# Patient Record
Sex: MALE | Race: BLACK OR AFRICAN AMERICAN
[De-identification: names, ages, dates, MRNs, and addresses within clinical notes are randomized per-mention and may not be internally consistent; named-entity substitution may affect disease eponyms.]

---

## 2017-06-16 ENCOUNTER — HOSPITAL ENCOUNTER (EMERGENCY)
Dept: HOSPITAL 44 - ED | Age: 37
Discharge: HOME | End: 2017-06-16
Payer: SELF-PAY

## 2017-06-16 VITALS — DIASTOLIC BLOOD PRESSURE: 64 MMHG | SYSTOLIC BLOOD PRESSURE: 118 MMHG

## 2017-06-16 DIAGNOSIS — G43.019: Primary | ICD-10-CM

## 2017-06-16 PROCEDURE — 99283 EMERGENCY DEPT VISIT LOW MDM: CPT

## 2017-06-16 PROCEDURE — 96372 THER/PROPH/DIAG INJ SC/IM: CPT

## 2017-06-16 RX ADMIN — PROMETHAZINE HYDROCHLORIDE ONE MG: 25 INJECTION INTRAMUSCULAR; INTRAVENOUS at 21:01

## 2017-06-16 RX ADMIN — KETOROLAC TROMETHAMINE ONE MG: 30 INJECTION, SOLUTION INTRAMUSCULAR at 21:01

## 2017-06-16 NOTE — ED PHYSICIAN DOCUMENTATION
Headache





- HISTORIAN


Historian: patient





- HPI


Stated Complaint: Migraine HA


Chief Complaint: Headache


Additional Information: 





started today, similar to other migraines


Onset: hours (2)


Timing: abrupt, still present


New Gradual Onset: Yes


Exposure To: none


Severity: moderate


Quality: similar to previous


Associated Symptoms: sensitivity to light, nausea


Preceding Symptoms: denies: visual disturbance, scotoma


Exacerbated By: light


Further Comments: no





- ROS


NEURO/PSYCH: denies: confusion, anxiety, depression, fainting


EYES/ENT: denies: sore throat, difficulty swallowing, sinus pain, drainage


CVS/RESP: none


GI/: denies: abdominal pain, diarrhea, problems urinating, incontinence


MS/SKIN/LYMPH: denies: muscle aches, back pain, rash, skin lesions, swollen 

glands





- PAST HX


Medical History: other (migraines)


Surgical History: no surgical history


Immunizations: referred to PCP


Allergies/Adverse Reactions: 


 Allergies











Allergy/AdvReac Type Severity Reaction Status Date / Time


 


Penicillins Allergy Severe Anaphylaxis Verified 06/16/17 20:33


 


amoxicillin Allergy   Verified 06/16/17 20:33














Home Medications: 


 Ambulatory Orders











 Medication  Instructions  Recorded


 


NK [NK]  11/27/16














- SOCIAL HX


Smoking History: cigarettes


Alcohol Use: occasionally


Drug Use: none





- Family HX


Family History: none





- VITAL SIGNS


Vital Signs: 





 Vital Signs











Temp Pulse Resp BP Pulse Ox


 


 97.8 F   50 L  14   117/68   99 


 


 06/16/17 20:19  06/16/17 20:19  06/16/17 20:19  06/16/17 20:19  06/16/17 20:19














- REVIEWED ASSESSMENTS


Nursing Assessment  Reviewed: No


Vitals Reviewed: No





Progress





- Results/Orders


Results/Orders: 





no testing ordered





- Progress


Progress: 





pt. given 6 mg imitrex sq, 60 mg toradol im  and 25 mg phenergan im in er








Critical Care Note





- Critical Care Note


Total Time (mins): 0





ED Results Lab/Radiology





- Lab Results


Lab Results: 


none taken





- Radiology


Radiology Impressions: 





none taken





- Orders


Orders: 





 ED Orders











 Category Date Time Status


 


 Ketorolac Tromethamine [Toradol] Med  06/16/17 20:45 Once





 60 mg IM NOW ONE   


 


 Promethazine HCl [Phenergan] Med  06/16/17 20:45 Once





 25 mg IM NOW ONE   


 


 SUMAtriptan SUCCINATE [Imitrex] Med  06/16/17 20:45 Once





 6 mg SQ NOW ONE   














Headache Physical Exam





- EXAM


General Appearance: alert, moderate distress


EENT: no facial swelling, PERRL, tender temporal artery, photophobia.  No: pain 

over sinuses


Neck: normal inspection, thyroid normal, supple.  No: thyromegaly, 

lymphadenopathy, stiff neck


Respiratory: no resp distress, chest non-tender, breath sounds normal


CVS: reg. rate & rhythm, heart sounds nml


Abdomen: non-tender, no organomegaly, nml bowel sounds, no distention


Skin: color nml, no rash.  No: cyanosis


Extremitites: non-tender, normal range of motion, no evidence of injury, no 

edema





- NEURO/PSYCH


Higher Functions: alert, oriented x3, nml speech, mood/affect nml


Cranial: nml as tested, no evidence of acute CVA


Cerebellar: nml as tested


Sensorimotor: motor nml, sensation nml





Discharge


Clincal Impression: 


Migraine


Qualifiers:


 Migraine type: without aura Status migrainosus presence: without status 

migrainosus Intractability: intractable Qualified Code(s): G43.019 - Migraine 

without aura, intractable, without status migrainosus





Referrals: 


Primary Doctor,No [Primary Care Provider] - 2 Days


Home Medications: 


Ambulatory Orders





NK [NK]  11/27/16 








Comments: 





discharged in stable condition with script for imitrex 100 mg 1 p.o. at onset 

of headache, may repeat x 1 in 2 hours prn.  Max dose 2 in 24 hours.


Condition: Stable


Disposition: 01 HOME, SELF-CARE


Decision to Admit: NO


Decision Time: 20:50

## 2018-02-15 ENCOUNTER — HOSPITAL ENCOUNTER (EMERGENCY)
Dept: HOSPITAL 44 - ED | Age: 38
Discharge: ANOTHER HOSPITAL | End: 2018-02-15
Payer: SELF-PAY

## 2018-02-15 VITALS — DIASTOLIC BLOOD PRESSURE: 74 MMHG | SYSTOLIC BLOOD PRESSURE: 134 MMHG

## 2018-02-15 DIAGNOSIS — H61.22: ICD-10-CM

## 2018-02-15 DIAGNOSIS — J36: Primary | ICD-10-CM

## 2018-02-15 DIAGNOSIS — D72.825: ICD-10-CM

## 2018-02-15 DIAGNOSIS — F17.210: ICD-10-CM

## 2018-02-15 LAB
BASOPHILS NFR BLD: 0.3 % (ref 0–1.5)
EGFR (AFRICAN): > 60
EGFR (NON-AFRICAN): > 60
EOSINOPHIL NFR BLD: 1.8 % (ref 0–6.8)
MCH RBC QN AUTO: 28.5 PG (ref 28–34)
MCV RBC AUTO: 89.4 FL (ref 80–100)
MONOCYTES %: 3.8 % (ref 0–11)
NEUTROPHILS #: 14.3 # K/UL (ref 1.4–7.7)

## 2018-02-15 PROCEDURE — S1016 NON-PVC INTRAVENOUS ADMINIST: HCPCS

## 2018-02-15 PROCEDURE — 99283 EMERGENCY DEPT VISIT LOW MDM: CPT

## 2018-02-15 PROCEDURE — 80048 BASIC METABOLIC PNL TOTAL CA: CPT

## 2018-02-15 PROCEDURE — 87070 CULTURE OTHR SPECIMN AEROBIC: CPT

## 2018-02-15 PROCEDURE — 70490 CT SOFT TISSUE NECK W/O DYE: CPT

## 2018-02-15 PROCEDURE — 85025 COMPLETE CBC W/AUTO DIFF WBC: CPT

## 2018-02-15 PROCEDURE — 87880 STREP A ASSAY W/OPTIC: CPT

## 2018-02-15 PROCEDURE — 96375 TX/PRO/DX INJ NEW DRUG ADDON: CPT

## 2018-02-15 PROCEDURE — 96365 THER/PROPH/DIAG IV INF INIT: CPT

## 2018-02-15 NOTE — ED PHYSICIAN DOCUMENTATION
Ear Complaints





- HISTORIAN


Historian: patient





- HPI


Stated Complaint: left ear pain


Chief Complaint: Ear Complaints


Timing: still present, worse


Further Comments: yes (37 year old male patient presents with complaint of left 

ear pain x 3 days.  Patient states he has been using tylenol at home.  Reports 

nasal congestion and sore throat.)





- ROS


CONST: no problems


CVS/RESP: none


GI/: denies: nausea, vomiting


MS/SKIN/LYMPH: none


NEURO/PSYCH: denies: weakness





- PAST HX


Past History: none


Allergies/Adverse Reactions: 


 Allergies











Allergy/AdvReac Type Severity Reaction Status Date / Time


 


Penicillins Allergy Severe Anaphylaxis Verified 02/15/18 10:33


 


amoxicillin Allergy   Verified 02/15/18 10:33


 


shellfish derived Allergy   Verified 02/15/18 12:49














Home Medications: 


 Ambulatory Orders











 Medication  Instructions  Recorded


 


NK [NK]  11/27/16














- SOCIAL HX


Smoking History: cigarettes





- FAMILY HX


Family History: No





- VITAL SIGNS


Vital Signs: 





 Vital Signs











Temp Pulse Resp BP Pulse Ox


 


 98.3 F   82   20   151/95   96 


 


 02/15/18 09:49  02/15/18 09:49  02/15/18 09:49  02/15/18 09:49  02/15/18 09:49














- REVIEWED ASSESSMENTS


Nursing Assessment  Reviewed: Yes


Vitals Reviewed: Yes





Progress





- Progress


Progress: 





Attempted to remove impaction with ear loop, patient unable to tolerate the 

pain. 





Irrigated - unable to completely remove impaction due to pain. 





left oropharynx with significant edema noted, will obtain CT soft tissue neck 

to r/o peritonsilar abscess. Patient with allergy to shellfish, will not use 

contrast, allergy caused difficulty breathing and "swelling" at age 12. 





Call from Dr Duran - reviewed CT findings.





1305 Discussed CT results with patient, recommended transfer to [facility] for ENT 

consult. 





1315  Patient accepted by Dr Camacho to ER for evaluation, orders for 

Clindamycin IV 





ED Results Lab/Radiology





- Radiology


Radiology Impressions: 





Examination: CT neck





History: STN W/O DUE TO ALLERGY TO SHELLFISH PATIENT STATES SWELLING BACK/UNDER 

TONGUE LEFT SIDE AND THROAT X 3 DAYS (Hx) / EDEMA SOFT TISSUE PHARNYX (DICOM Hx)





Comparison exams: None provided





Technique: CT neck with contrast





Findings: Significant swelling involving the left tonsillar region. Suggestion 

for a faint low density region centrally measuring approximately 6 mm. Streak 

artifact from dental hardware limits sensitivity visualizing the central aspect 

of this area. Prominent pre vertebral adenoid tissue. Parotid and submandibular 

glands are without abnormality.No pathologic adenopathy involving the carotid, 

jugular and posterior cervical chain regions.  Oropharynx is narrowed and 

shifted to the right. Lower neck structures including thyroid gland are without 

abnormality. Apical lung fields and osseous structures are within normal 

limits. Skull base structures including brain parenchyma are without 

abnormality.





Impression: Significant left oropharynx soft tissue fullness/edema. Suggestion 

for low density area centrally possibly representing peritonsillar abscess. 

Exam sensitivity is limited due to inability to administer contrast as well as 

streak artifact from dental hardware. ENT consultation recommended.





Discussed findings with Dr. Garcia at 1300 hours on 15 February 2018 CDT


 


Electronically signed on Feb 15, 2018 1:03:02 PM CST by:


Yobany Duran





- Orders


Orders: 





 ED Orders











 Category Date Time Status


 


 Irrigate Ear 1T Care  02/15/18 10:56 Active


 


 Rapid Strep [GRP A STREP SCREEN] Stat Lab  02/15/18 10:19 Ordered














Ear Complaint Physical Exam





- EXAM


General Appearance: moderate distress, other (muffled, hot potato voice noted)


Ear: left, cerumen (impaction left ear)


Mouth/Throat: lips nml, gums nml, pharyngeal erythema (significant edema noted 

in left oropharynx)


Nose: nml inspection


Head/Neck: atraumatic, neck swelling (left - tonsilar, submandibular, submental 

and anterior cervical lymph chains with edema noted. )


Resp/CVS: chest non-tender, breath sounds nml, heart sounds nml


Abdomen: non-tender, no organomegaly


Skin: nml color, no skin rash


Neuro/Psych: oriented x3, mood/affect nml





Discharge


Clincal Impression: 


 Peritonsillar abscess, Impacted cerumen of left ear





Leukocytosis


Qualifiers:


 Leukocytosis type: bandemia Qualified Code(s): D72.825 - Bandemia





Referrals: 


Primary Doctor,No [Primary Care Provider] - 2 Days


Condition: Serious


Disposition: 02 XFER SHT-TRM HOSP


Decision to Admit: NO


Decision Time: 13:15

## 2018-02-15 NOTE — DIAGNOSTIC IMAGING REPORT
KAY ARELLANO (NP) - ER 

[facility]

[address] [address]

[place], [place]. [address]

 

 

 

 

Report Submission Date: Feb 15, 2018 1:03:02 PM CST

Patient       Study

Name:   OWEN PEARSON       Date:   Feb 15, 2018 12:25:38 PM CST

MRN:          Modality Type:   CT\SR

Gender:   M       Description:   CT STN W/O

:   80       Institution:   [facility]

Physician:   AKY ARELLANO (NP) - ER

     Accession:    [id number]

 

 

Examination: CT neck 



History: STN W/O DUE TO ALLERGY TO SHELLFISH PATIENT STATES SWELLING BACK/UNDER 
TONGUE LEFT SIDE AND THROAT X 3 DAYS (Hx) / EDEMA SOFT TISSUE PHARNYX (DICOM Hx
) 



Comparison exams: None provided 



Technique: CT neck with contrast 



Findings: Significant swelling involving the left tonsillar region. Suggestion 
for a faint low density region centrally measuring approximately 6 mm. Streak 
artifact from dental hardware limits sensitivity visualizing the central aspect 
of this area. Prominent pre vertebral adenoid tissue. Parotid and submandibular 
glands are without abnormality.No pathologic adenopathy involving the carotid, 
jugular and posterior cervical chain regions.  Oropharynx is narrowed and 
shifted to the right. Lower neck structures including thyroid gland are without 
abnormality. Apical lung fields and osseous structures are within normal 
limits. Skull base structures including brain parenchyma are without 
abnormality. 



Impression: Significant left oropharynx soft tissue fullness/edema. Suggestion 
for low density area centrally possibly representing peritonsillar abscess. 
Exam sensitivity is limited due to inability to administer contrast as well as 
streak artifact from dental hardware. ENT consultation recommended. 



Discussed findings with Dr. Arellano at 1300 hours on 15 February 2018 CDT

 

Electronically signed on Feb 15, 2018 1:03:02 PM CST by:

Yobany OTOOLE

## 2019-02-08 ENCOUNTER — HOSPITAL ENCOUNTER (EMERGENCY)
Dept: HOSPITAL 44 - ED | Age: 39
Discharge: HOME | End: 2019-02-08
Payer: SELF-PAY

## 2019-02-08 VITALS — SYSTOLIC BLOOD PRESSURE: 155 MMHG | DIASTOLIC BLOOD PRESSURE: 98 MMHG

## 2019-02-08 DIAGNOSIS — K04.7: Primary | ICD-10-CM

## 2019-02-08 PROCEDURE — 99282 EMERGENCY DEPT VISIT SF MDM: CPT

## 2019-02-08 NOTE — ED PHYSICIAN DOCUMENTATION
Sore Throat/Dental Pain





- HISTORIAN


Historian: patient





- HPI


Stated Complaint: dental pain


Chief Complaint: Dental Pain


Additional Information: 





Patient presents to ED with a 2 day history of worsening right upper dental 

pain.   Patient states he had a tooth extracted 3 years ago and 2 days ago he 

began to have pain and swelling at the gum where the tooth was extracted.  


Onset: days ago (2)


Context: Dental Caries, Possible Infection


Associated Symptoms: denies: fever, sore throat





- ROS


CONST: no problems


CVS/RESP: none


GI/: denies: nausea, vomiting


MS/SKIN/LYMPH: denies: muscle aches





- PAST HX


Past History: gum disease


Other History: none


Allergies/Adverse Reactions: 


                                    Allergies











Allergy/AdvReac Type Severity Reaction Status Date / Time


 


Penicillins Allergy Severe Anaphylaxis Verified 02/15/18 10:33


 


amoxicillin Allergy   Verified 02/15/18 10:33


 


shellfish derived Allergy   Verified 02/15/18 12:49














Home Medications: 


                                Ambulatory Orders











 Medication  Instructions  Recorded


 


NK  11/27/16














- SOCIAL HX


Smoking History: cigarettes


Alcohol Use: occasionally


Drug Use: marijuana





- FAMILY HX


Family History: No





- VITAL SIGNS


Vital Signs: 





                                   Vital Signs











Temp Pulse Resp BP Pulse Ox


 


          134/74    


 


          02/15/18 14:47   














- REVIEWED ASSESSMENTS


Nursing Assessment  Reviewed: Yes


Vitals Reviewed: Yes





ED Results Lab/Radiology





- Orders


Orders: 





                                    ED Orders











 Category Date Time Status


 


 Clindamycin HCl [Cleocin] Med  02/08/19 20:26 Once





 300 mg PO NOW ONE   


 


 traMADol HCL [Ultram] Med  02/08/19 20:26 Once





 50 mg PO NOW ONE   














Dental Pain Physical Exam





- EXAM


General Appearance: no acute distress, alert


Head/Neck: head nml inspection


Eyes: PERRL


Mouth/Throat: dental tenderness, gum swelling around teeth


Ear/Nose: nml inspection


Respiratory: no resp. distress, breath sounds nml, respiratory distress


CVS: reg. rate & rhythm, heart sounds nml


Abdomen: soft, normal bowel sounds


Extremities: non-tender


Skin: warm/dry


Neuro/Psych: none





Discharge


Clincal Impression: 


 Dental abscess





Referrals: 


Primary Doctor,No [Primary Care Provider] - 2 Days


Additional Instructions: 


1.  Tylenol and/or Ibuprofen as needed for pain


2.  Tramadol as needed for break through pain


3.  1 tsp salt and 1 tsp baking soda in warm water.  Rinse mouth after each meal


4.  Take Clindamycin as directed


5.  Follow up with Dentist as soon as possible


6.  Return to ER with new or worsening symptoms.


Condition: Stable


Disposition: 01 HOME, SELF-CARE


Decision to Admit: NO


Date of Decison to Admit: 02/08/19


Decision Time: 20:35

## 2019-08-09 ENCOUNTER — HOSPITAL ENCOUNTER (EMERGENCY)
Dept: HOSPITAL 44 - ED | Age: 39
Discharge: HOME | End: 2019-08-09
Payer: SELF-PAY

## 2019-08-09 VITALS — DIASTOLIC BLOOD PRESSURE: 82 MMHG | SYSTOLIC BLOOD PRESSURE: 120 MMHG
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
## 2019-08-09 DIAGNOSIS — M25.561: Primary | ICD-10-CM

## 2019-08-09 PROCEDURE — 99283 EMERGENCY DEPT VISIT LOW MDM: CPT

## 2019-08-09 PROCEDURE — 73562 X-RAY EXAM OF KNEE 3: CPT

## 2019-08-09 PROCEDURE — 96372 THER/PROPH/DIAG INJ SC/IM: CPT

## 2019-08-09 PROCEDURE — 99282 EMERGENCY DEPT VISIT SF MDM: CPT

## 2019-08-09 NOTE — DIAGNOSTIC IMAGING REPORT
FOSTER CAMERON 

[facility]

[address] [address]

[place], [place]. [address]

 

 

 

 

Report Submission Date: Aug 9, 2019 6:43:41 AM CDT

Patient       Study

Name:   OWEN CASH       Date:   Aug 9, 2019 5:33:08 AM CDT

MRN:   [id number]       Modality Type:   DX

Gender:   M       Description:   KNEE 3 VIEWS

:   80       Institution:   [facility]

Physician:   FOSTER CAMERON

     Accession:    [id number]

 

 

3 views right knee 



Clinical history: pain 



Findings: 



No acute fracture dislocation is identified. The alignment is normal. Joint 
spaces are maintained. No joint effusion. 



Impression: 



Negative

 

Electronically signed on Aug 9, 2019 6:43:41 AM CDT by:

Maykel OTOOLE

## 2019-08-09 NOTE — ED PHYSICIAN DOCUMENTATION
Lower Extremity Injury





- HISTORIAN


Historian: patient





- HPI


Stated Complaint: Fell on Rt knee couple days prior


Chief Complaint: Lower Extremity Injury


Onset: days ago (4)


Where: home


Severity: mild


Context: direct blow


Associated Symptoms:: tingling


Modifying Factors:: pain on movement





- ROS


CONST: no problems





- PAST HX


Past History: none


Immunizations: UTD


Allergies/Adverse Reactions: 


                                    Allergies











Allergy/AdvReac Type Severity Reaction Status Date / Time


 


Penicillins Allergy Severe Anaphylaxis Verified 08/09/19 05:35


 


amoxicillin Allergy   Verified 08/09/19 05:35


 


shellfish derived Allergy   Verified 08/09/19 05:35














Home Medications: 


                                Ambulatory Orders











 Medication  Instructions  Recorded


 


NK  11/27/16














- SOCIAL HX


Smoking History: cigarettes


Alcohol Use: none


Drug Use: none





- FAMILY HX


Family History: none





- VITAL SIGNS


Vital Signs: 


                                   Vital Signs











Temp Pulse Resp BP Pulse Ox


 


    54 L  14   120/82   100 


 


    08/09/19 05:01  08/09/19 05:01  08/09/19 05:01  08/09/19 05:01














- REVIEWED ASSESSMENTS


Nursing Assessment  Reviewed: Yes


Vitals Reviewed: Yes





ED Results Lab/Radiology





- Radiology


Radiology Impressions: 


3 views right knee 





Clinical history: pain 





Findings: 





No acute fracture dislocation is identified. The alignment is normal. Joint 

spaces are maintained. No joint effusion. 





Impression: 





Negative 


Electronically signed on Aug 9, 2019 6:43:41 AM CDT by: 


Maykel Ramírez





- Orders


Orders: 


                                    ED Orders











 Category Date Time Status


 


 XR KNEE 3 VIEWS [KNEE 3 VIEWS] [RAD] Stat Exams  08/09/19 Taken


 


 Ketorolac Tromethamine [Toradol] Med  08/09/19 06:33 Once





 60 mg IM NOW ONE   














Lower Extremities Injury Phy





- Physical Exam


General Appearance: no acute distress, alert


Hips: bilateral hip: non-tender, normal inspection, normal range of motion, no 

evidence of injury


Knees: right: bone tenderness, pain, left: non-tender, normal inspection, normal

 range of motion, no evidence of injury, bilateral: deformity, soft tissue 

tenderness, swelling


Ligaments: pain on anterior drawer


Gait: normal, limited by pain, unable to bear weight


Neuro/Vascular/Tendon: no vascular compromise


Neck/Back: nml inspection


Resp/CVS: chest non-tender, breath sounds nml, heart sounds nml, no resp. 

distress, lungs clear, reg. rate & rhythm


Abdomen: non-tender





Discharge


Clincal Impression: 


Knee pain


Qualifiers:


 Chronicity: acute Laterality: right Qualified Code(s): M25.561 - Pain in right 

knee





Referrals: 


Primary Doctor,No [Primary Care Provider] - 2 Days


Comments: 





1. OTC meds as directed for pain as needed


2. Follow up with PCP if pain continues in 2-4 days


3. Return to ER for any increasing concerns 


Condition: Stable


Disposition: 01 HOME, SELF-CARE


Decision to Admit: NO


Date of Decison to Admit: 08/09/19


Decision Time: 06:58